# Patient Record
Sex: FEMALE | Race: WHITE | NOT HISPANIC OR LATINO | Employment: OTHER | URBAN - METROPOLITAN AREA
[De-identification: names, ages, dates, MRNs, and addresses within clinical notes are randomized per-mention and may not be internally consistent; named-entity substitution may affect disease eponyms.]

---

## 2018-01-03 ENCOUNTER — EST. PATIENT EMERGENCY (OUTPATIENT)
Dept: URBAN - METROPOLITAN AREA CLINIC 43 | Facility: CLINIC | Age: 78
End: 2018-01-03

## 2018-01-03 DIAGNOSIS — H00.023: ICD-10-CM

## 2018-01-03 DIAGNOSIS — H00.026: ICD-10-CM

## 2018-01-03 PROCEDURE — 92012 INTRM OPH EXAM EST PATIENT: CPT

## 2018-01-03 PROCEDURE — 1036F TOBACCO NON-USER: CPT

## 2018-01-03 PROCEDURE — G8427 DOCREV CUR MEDS BY ELIG CLIN: HCPCS

## 2018-01-03 ASSESSMENT — VISUAL ACUITY
OD_SC: 20/30+1
OS_SC: 20/40-2
OS_SC: J1
OD_SC: J1

## 2018-10-11 ENCOUNTER — ESTABLISHED COMPREHENSIVE EXAM (OUTPATIENT)
Dept: URBAN - METROPOLITAN AREA CLINIC 43 | Facility: CLINIC | Age: 78
End: 2018-10-11

## 2018-10-11 DIAGNOSIS — Z96.1: ICD-10-CM

## 2018-10-11 DIAGNOSIS — H02.886: ICD-10-CM

## 2018-10-11 DIAGNOSIS — H04.123: ICD-10-CM

## 2018-10-11 DIAGNOSIS — H26.493: ICD-10-CM

## 2018-10-11 DIAGNOSIS — H02.883: ICD-10-CM

## 2018-10-11 PROCEDURE — G8428 CUR MEDS NOT DOCUMENT: HCPCS

## 2018-10-11 PROCEDURE — G9903 PT SCRN TBCO ID AS NON USER: HCPCS

## 2018-10-11 PROCEDURE — G8756 NO BP MEASURE DOC: HCPCS

## 2018-10-11 PROCEDURE — 92015 DETERMINE REFRACTIVE STATE: CPT

## 2018-10-11 PROCEDURE — 92014 COMPRE OPH EXAM EST PT 1/>: CPT

## 2018-10-11 PROCEDURE — 1036F TOBACCO NON-USER: CPT

## 2018-10-11 ASSESSMENT — VISUAL ACUITY
OD_SC: 20/40+2
OS_SC: J10
OS_SC: 20/50-2
OD_CC: J1
OS_CC: J1
OD_SC: J8

## 2018-10-11 ASSESSMENT — TONOMETRY
OS_IOP_MMHG: 16
OD_IOP_MMHG: 17

## 2019-08-28 ENCOUNTER — EST. PATIENT EMERGENCY (OUTPATIENT)
Dept: URBAN - METROPOLITAN AREA CLINIC 43 | Facility: CLINIC | Age: 79
End: 2019-08-28

## 2019-08-28 DIAGNOSIS — H26.493: ICD-10-CM

## 2019-08-28 DIAGNOSIS — H02.883: ICD-10-CM

## 2019-08-28 DIAGNOSIS — H04.123: ICD-10-CM

## 2019-08-28 DIAGNOSIS — H35.341: ICD-10-CM

## 2019-08-28 DIAGNOSIS — H35.3211: ICD-10-CM

## 2019-08-28 DIAGNOSIS — H02.886: ICD-10-CM

## 2019-08-28 PROCEDURE — 92134 CPTRZ OPH DX IMG PST SGM RTA: CPT

## 2019-08-28 PROCEDURE — 92012 INTRM OPH EXAM EST PATIENT: CPT

## 2019-08-28 PROCEDURE — 92015 DETERMINE REFRACTIVE STATE: CPT

## 2019-08-28 ASSESSMENT — TONOMETRY
OD_IOP_MMHG: 18
OS_IOP_MMHG: 19

## 2019-08-28 ASSESSMENT — VISUAL ACUITY
OS_SC: 20/40-2
OD_SC: 20/400

## 2019-09-04 ENCOUNTER — RETINA CONSULT (OUTPATIENT)
Dept: URBAN - METROPOLITAN AREA CLINIC 43 | Facility: CLINIC | Age: 79
End: 2019-09-04

## 2019-09-04 DIAGNOSIS — H35.363: ICD-10-CM

## 2019-09-04 DIAGNOSIS — H35.731: ICD-10-CM

## 2019-09-04 DIAGNOSIS — H35.3211: ICD-10-CM

## 2019-09-04 DIAGNOSIS — H35.3122: ICD-10-CM

## 2019-09-04 DIAGNOSIS — H35.722: ICD-10-CM

## 2019-09-04 DIAGNOSIS — H43.813: ICD-10-CM

## 2019-09-04 PROCEDURE — 92250 FUNDUS PHOTOGRAPHY W/I&R: CPT

## 2019-09-04 PROCEDURE — 9222550 BILAT EXTENDED OPHTHALMOSCOPY, FIRST

## 2019-09-04 PROCEDURE — 92014 COMPRE OPH EXAM EST PT 1/>: CPT

## 2019-09-04 PROCEDURE — 67028 INJECTION EYE DRUG: CPT

## 2019-09-04 PROCEDURE — 92235 FLUORESCEIN ANGRPH MLTIFRAME: CPT

## 2019-09-04 ASSESSMENT — VISUAL ACUITY
OS_SC: 20/30-2
OD_SC: 20/200

## 2019-09-04 ASSESSMENT — TONOMETRY
OS_IOP_MMHG: 20
OD_IOP_MMHG: 20

## 2019-09-16 ENCOUNTER — EST. PATIENT EMERGENCY (OUTPATIENT)
Dept: URBAN - METROPOLITAN AREA CLINIC 43 | Facility: CLINIC | Age: 79
End: 2019-09-16

## 2019-09-16 DIAGNOSIS — H04.123: ICD-10-CM

## 2019-09-16 DIAGNOSIS — H02.883: ICD-10-CM

## 2019-09-16 DIAGNOSIS — H02.886: ICD-10-CM

## 2019-09-16 PROCEDURE — 92012 INTRM OPH EXAM EST PATIENT: CPT

## 2019-09-16 RX ORDER — LOTEPREDNOL ETABONATE 5 MG/G: 1 GEL OPHTHALMIC TWICE A DAY

## 2019-09-16 ASSESSMENT — VISUAL ACUITY
OS_SC: 20/40-2
OD_SC: CF 6FT

## 2019-10-04 ENCOUNTER — ESTABLISHED PATIENT (OUTPATIENT)
Dept: URBAN - METROPOLITAN AREA CLINIC 43 | Facility: CLINIC | Age: 79
End: 2019-10-04

## 2019-10-04 DIAGNOSIS — H35.363: ICD-10-CM

## 2019-10-04 DIAGNOSIS — H43.813: ICD-10-CM

## 2019-10-04 DIAGNOSIS — H35.3122: ICD-10-CM

## 2019-10-04 DIAGNOSIS — H35.3211: ICD-10-CM

## 2019-10-04 DIAGNOSIS — H35.731: ICD-10-CM

## 2019-10-04 DIAGNOSIS — H35.722: ICD-10-CM

## 2019-10-04 PROCEDURE — 67028 INJECTION EYE DRUG: CPT

## 2019-10-04 PROCEDURE — 92134 CPTRZ OPH DX IMG PST SGM RTA: CPT

## 2019-10-04 PROCEDURE — 92012 INTRM OPH EXAM EST PATIENT: CPT

## 2019-10-04 ASSESSMENT — TONOMETRY
OS_IOP_MMHG: 12
OD_IOP_MMHG: 13

## 2019-10-04 ASSESSMENT — VISUAL ACUITY
OD_SC: CF 4FT
OS_SC: 20/50-2

## 2019-11-01 ENCOUNTER — ESTABLISHED PATIENT (OUTPATIENT)
Dept: URBAN - METROPOLITAN AREA CLINIC 43 | Facility: CLINIC | Age: 79
End: 2019-11-01

## 2019-11-01 DIAGNOSIS — H35.3211: ICD-10-CM

## 2019-11-01 DIAGNOSIS — H35.3122: ICD-10-CM

## 2019-11-01 PROCEDURE — 92273 FULL FIELD ERG W/I&R: CPT

## 2019-11-01 PROCEDURE — 92250 FUNDUS PHOTOGRAPHY W/I&R: CPT

## 2019-11-01 PROCEDURE — 92012 INTRM OPH EXAM EST PATIENT: CPT

## 2019-11-01 PROCEDURE — 67028 INJECTION EYE DRUG: CPT

## 2019-11-01 PROCEDURE — 9222650 BILAT EXTENDED OPHTHALMOSCOPY, F/U

## 2019-11-01 PROCEDURE — 92134 CPTRZ OPH DX IMG PST SGM RTA: CPT

## 2019-11-01 ASSESSMENT — TONOMETRY
OD_IOP_MMHG: 14
OS_IOP_MMHG: 13

## 2019-11-01 ASSESSMENT — VISUAL ACUITY
OD_SC: 20/60-2
OS_SC: 20/40+1

## 2019-12-04 ENCOUNTER — ESTABLISHED COMPREHENSIVE EXAM (OUTPATIENT)
Dept: URBAN - METROPOLITAN AREA CLINIC 43 | Facility: CLINIC | Age: 79
End: 2019-12-04

## 2019-12-04 DIAGNOSIS — Z96.1: ICD-10-CM

## 2019-12-04 DIAGNOSIS — H35.363: ICD-10-CM

## 2019-12-04 DIAGNOSIS — H31.013: ICD-10-CM

## 2019-12-04 DIAGNOSIS — H04.123: ICD-10-CM

## 2019-12-04 DIAGNOSIS — H35.731: ICD-10-CM

## 2019-12-04 DIAGNOSIS — H35.722: ICD-10-CM

## 2019-12-04 DIAGNOSIS — H35.09: ICD-10-CM

## 2019-12-04 DIAGNOSIS — H35.3211: ICD-10-CM

## 2019-12-04 DIAGNOSIS — H35.033: ICD-10-CM

## 2019-12-04 DIAGNOSIS — H02.886: ICD-10-CM

## 2019-12-04 DIAGNOSIS — H35.3122: ICD-10-CM

## 2019-12-04 DIAGNOSIS — H02.883: ICD-10-CM

## 2019-12-04 DIAGNOSIS — H43.813: ICD-10-CM

## 2019-12-04 PROCEDURE — 92015 DETERMINE REFRACTIVE STATE: CPT

## 2019-12-04 PROCEDURE — 92014 COMPRE OPH EXAM EST PT 1/>: CPT

## 2019-12-04 ASSESSMENT — VISUAL ACUITY
OD_SC: 20/60-2
OS_CC: J1
OD_SC: J10
OD_CC: J2-
OS_SC: 20/40-2
OS_SC: J8-

## 2019-12-04 ASSESSMENT — TONOMETRY
OS_IOP_MMHG: 18
OD_IOP_MMHG: 18

## 2019-12-06 ENCOUNTER — ESTABLISHED PATIENT (OUTPATIENT)
Dept: URBAN - METROPOLITAN AREA CLINIC 43 | Facility: CLINIC | Age: 79
End: 2019-12-06

## 2019-12-06 DIAGNOSIS — H35.3122: ICD-10-CM

## 2019-12-06 DIAGNOSIS — H35.731: ICD-10-CM

## 2019-12-06 DIAGNOSIS — H43.813: ICD-10-CM

## 2019-12-06 DIAGNOSIS — H35.3211: ICD-10-CM

## 2019-12-06 DIAGNOSIS — H31.013: ICD-10-CM

## 2019-12-06 DIAGNOSIS — H35.722: ICD-10-CM

## 2019-12-06 DIAGNOSIS — H35.033: ICD-10-CM

## 2019-12-06 DIAGNOSIS — H35.363: ICD-10-CM

## 2019-12-06 DIAGNOSIS — H35.09: ICD-10-CM

## 2019-12-06 PROCEDURE — 92250 FUNDUS PHOTOGRAPHY W/I&R: CPT

## 2019-12-06 PROCEDURE — 92235 FLUORESCEIN ANGRPH MLTIFRAME: CPT

## 2019-12-06 PROCEDURE — 67028 INJECTION EYE DRUG: CPT

## 2019-12-06 PROCEDURE — 92012 INTRM OPH EXAM EST PATIENT: CPT

## 2019-12-06 ASSESSMENT — VISUAL ACUITY
OD_SC: 20/70-1
OS_SC: 20/40-2

## 2019-12-06 ASSESSMENT — TONOMETRY
OD_IOP_MMHG: 14
OS_IOP_MMHG: 16

## 2020-01-10 ENCOUNTER — ESTABLISHED PATIENT (OUTPATIENT)
Dept: URBAN - METROPOLITAN AREA CLINIC 43 | Facility: CLINIC | Age: 80
End: 2020-01-10

## 2020-01-10 DIAGNOSIS — H35.3211: ICD-10-CM

## 2020-01-10 DIAGNOSIS — H35.3122: ICD-10-CM

## 2020-01-10 PROCEDURE — 92134 CPTRZ OPH DX IMG PST SGM RTA: CPT

## 2020-01-10 PROCEDURE — 92012 INTRM OPH EXAM EST PATIENT: CPT

## 2020-01-10 PROCEDURE — 67028 INJECTION EYE DRUG: CPT

## 2020-01-10 ASSESSMENT — TONOMETRY
OD_IOP_MMHG: 12
OS_IOP_MMHG: 13

## 2020-01-10 ASSESSMENT — VISUAL ACUITY
OD_SC: 20/60+1
OS_SC: 20/40-1

## 2020-02-19 ENCOUNTER — ESTABLISHED PATIENT (OUTPATIENT)
Dept: URBAN - METROPOLITAN AREA CLINIC 43 | Facility: CLINIC | Age: 80
End: 2020-02-19

## 2020-02-19 DIAGNOSIS — H35.3211: ICD-10-CM

## 2020-02-19 DIAGNOSIS — H35.3122: ICD-10-CM

## 2020-02-19 DIAGNOSIS — H35.722: ICD-10-CM

## 2020-02-19 DIAGNOSIS — H35.731: ICD-10-CM

## 2020-02-19 PROCEDURE — 92235 FLUORESCEIN ANGRPH MLTIFRAME: CPT

## 2020-02-19 PROCEDURE — 92250 FUNDUS PHOTOGRAPHY W/I&R: CPT

## 2020-02-19 PROCEDURE — 67028 INJECTION EYE DRUG: CPT

## 2020-02-19 PROCEDURE — 92012 INTRM OPH EXAM EST PATIENT: CPT

## 2020-02-19 ASSESSMENT — VISUAL ACUITY
OD_SC: 20/70+2
OS_SC: 20/40

## 2020-02-19 ASSESSMENT — TONOMETRY
OS_IOP_MMHG: 18
OD_IOP_MMHG: 19

## 2020-03-25 ENCOUNTER — ESTABLISHED PATIENT (OUTPATIENT)
Dept: URBAN - METROPOLITAN AREA CLINIC 43 | Facility: CLINIC | Age: 80
End: 2020-03-25

## 2020-03-25 DIAGNOSIS — H35.363: ICD-10-CM

## 2020-03-25 DIAGNOSIS — H35.3122: ICD-10-CM

## 2020-03-25 DIAGNOSIS — H43.813: ICD-10-CM

## 2020-03-25 DIAGNOSIS — H35.033: ICD-10-CM

## 2020-03-25 DIAGNOSIS — H35.3211: ICD-10-CM

## 2020-03-25 DIAGNOSIS — H35.731: ICD-10-CM

## 2020-03-25 DIAGNOSIS — H35.09: ICD-10-CM

## 2020-03-25 DIAGNOSIS — H35.722: ICD-10-CM

## 2020-03-25 PROCEDURE — 92134 CPTRZ OPH DX IMG PST SGM RTA: CPT

## 2020-03-25 PROCEDURE — 92014 COMPRE OPH EXAM EST PT 1/>: CPT

## 2020-03-25 PROCEDURE — 92201 OPSCPY EXTND RTA DRAW UNI/BI: CPT

## 2020-03-25 PROCEDURE — 67028 INJECTION EYE DRUG: CPT

## 2020-03-25 ASSESSMENT — TONOMETRY
OS_IOP_MMHG: 20
OD_IOP_MMHG: 20

## 2020-03-25 ASSESSMENT — VISUAL ACUITY
OD_SC: 20/60-2
OS_SC: 20/40

## 2020-05-06 ENCOUNTER — ESTABLISHED PATIENT (OUTPATIENT)
Dept: URBAN - METROPOLITAN AREA CLINIC 43 | Facility: CLINIC | Age: 80
End: 2020-05-06

## 2020-05-06 DIAGNOSIS — H35.3122: ICD-10-CM

## 2020-05-06 DIAGNOSIS — H35.722: ICD-10-CM

## 2020-05-06 DIAGNOSIS — H35.731: ICD-10-CM

## 2020-05-06 DIAGNOSIS — H35.3211: ICD-10-CM

## 2020-05-06 PROCEDURE — 92012 INTRM OPH EXAM EST PATIENT: CPT

## 2020-05-06 PROCEDURE — 67028 INJECTION EYE DRUG: CPT

## 2020-05-06 PROCEDURE — 92134 CPTRZ OPH DX IMG PST SGM RTA: CPT

## 2020-05-06 PROCEDURE — 92235 FLUORESCEIN ANGRPH MLTIFRAME: CPT

## 2020-05-06 ASSESSMENT — VISUAL ACUITY
OS_SC: 20/30
OD_SC: 20/60

## 2020-06-24 ENCOUNTER — ESTABLISHED PATIENT (OUTPATIENT)
Dept: URBAN - METROPOLITAN AREA CLINIC 43 | Facility: CLINIC | Age: 80
End: 2020-06-24

## 2020-06-24 DIAGNOSIS — H35.3122: ICD-10-CM

## 2020-06-24 DIAGNOSIS — H35.09: ICD-10-CM

## 2020-06-24 DIAGNOSIS — H35.033: ICD-10-CM

## 2020-06-24 DIAGNOSIS — H35.731: ICD-10-CM

## 2020-06-24 DIAGNOSIS — H35.722: ICD-10-CM

## 2020-06-24 DIAGNOSIS — H43.813: ICD-10-CM

## 2020-06-24 DIAGNOSIS — H35.363: ICD-10-CM

## 2020-06-24 DIAGNOSIS — H35.3211: ICD-10-CM

## 2020-06-24 DIAGNOSIS — H31.013: ICD-10-CM

## 2020-06-24 PROCEDURE — 92134 CPTRZ OPH DX IMG PST SGM RTA: CPT

## 2020-06-24 PROCEDURE — 92201 OPSCPY EXTND RTA DRAW UNI/BI: CPT

## 2020-06-24 PROCEDURE — 92014 COMPRE OPH EXAM EST PT 1/>: CPT

## 2020-06-24 PROCEDURE — 67028 INJECTION EYE DRUG: CPT

## 2020-06-24 ASSESSMENT — VISUAL ACUITY
OS_SC: 20/50
OS_PH: 20/30-1
OD_PH: 20/60+1
OD_SC: 20/70+2

## 2020-08-12 ENCOUNTER — ESTABLISHED PATIENT (OUTPATIENT)
Dept: URBAN - METROPOLITAN AREA CLINIC 43 | Facility: CLINIC | Age: 80
End: 2020-08-12

## 2020-08-12 DIAGNOSIS — H35.3211: ICD-10-CM

## 2020-08-12 DIAGNOSIS — H35.731: ICD-10-CM

## 2020-08-12 DIAGNOSIS — H43.813: ICD-10-CM

## 2020-08-12 DIAGNOSIS — H35.30: ICD-10-CM

## 2020-08-12 DIAGNOSIS — H35.722: ICD-10-CM

## 2020-08-12 DIAGNOSIS — H35.363: ICD-10-CM

## 2020-08-12 DIAGNOSIS — H31.013: ICD-10-CM

## 2020-08-12 DIAGNOSIS — H35.3122: ICD-10-CM

## 2020-08-12 PROCEDURE — 67028 INJECTION EYE DRUG: CPT

## 2020-08-12 PROCEDURE — 92012 INTRM OPH EXAM EST PATIENT: CPT

## 2020-08-12 PROCEDURE — 92250 FUNDUS PHOTOGRAPHY W/I&R: CPT

## 2020-08-12 ASSESSMENT — VISUAL ACUITY
OS_CC: 20/25
OD_CC: 20/40

## 2020-08-12 ASSESSMENT — TONOMETRY
OS_IOP_MMHG: 16
OD_IOP_MMHG: 16

## 2020-09-30 ENCOUNTER — ESTABLISHED PATIENT (OUTPATIENT)
Dept: URBAN - METROPOLITAN AREA CLINIC 43 | Facility: CLINIC | Age: 80
End: 2020-09-30

## 2020-09-30 DIAGNOSIS — H43.813: ICD-10-CM

## 2020-09-30 DIAGNOSIS — H35.722: ICD-10-CM

## 2020-09-30 DIAGNOSIS — H35.3122: ICD-10-CM

## 2020-09-30 DIAGNOSIS — H35.3211: ICD-10-CM

## 2020-09-30 DIAGNOSIS — H35.731: ICD-10-CM

## 2020-09-30 DIAGNOSIS — H35.033: ICD-10-CM

## 2020-09-30 DIAGNOSIS — H35.363: ICD-10-CM

## 2020-09-30 DIAGNOSIS — H31.013: ICD-10-CM

## 2020-09-30 PROCEDURE — 92202 OPSCPY EXTND ON/MAC DRAW: CPT

## 2020-09-30 PROCEDURE — 67028 INJECTION EYE DRUG: CPT

## 2020-09-30 PROCEDURE — 92134 CPTRZ OPH DX IMG PST SGM RTA: CPT

## 2020-09-30 PROCEDURE — 92012 INTRM OPH EXAM EST PATIENT: CPT

## 2020-09-30 ASSESSMENT — VISUAL ACUITY
OD_CC: 20/40
OS_CC: 20/30-2

## 2020-09-30 ASSESSMENT — TONOMETRY
OS_IOP_MMHG: 12
OD_IOP_MMHG: 12

## 2020-11-18 ENCOUNTER — ESTABLISHED PATIENT (OUTPATIENT)
Dept: URBAN - METROPOLITAN AREA CLINIC 43 | Facility: CLINIC | Age: 80
End: 2020-11-18

## 2020-11-18 DIAGNOSIS — H35.09: ICD-10-CM

## 2020-11-18 DIAGNOSIS — H35.363: ICD-10-CM

## 2020-11-18 DIAGNOSIS — H35.3211: ICD-10-CM

## 2020-11-18 DIAGNOSIS — H35.731: ICD-10-CM

## 2020-11-18 DIAGNOSIS — H31.013: ICD-10-CM

## 2020-11-18 DIAGNOSIS — H35.722: ICD-10-CM

## 2020-11-18 DIAGNOSIS — H35.033: ICD-10-CM

## 2020-11-18 DIAGNOSIS — H43.813: ICD-10-CM

## 2020-11-18 DIAGNOSIS — H35.3122: ICD-10-CM

## 2020-11-18 PROCEDURE — 67028 INJECTION EYE DRUG: CPT

## 2020-11-18 PROCEDURE — 92012 INTRM OPH EXAM EST PATIENT: CPT

## 2020-11-18 PROCEDURE — 92250 FUNDUS PHOTOGRAPHY W/I&R: CPT

## 2020-11-18 ASSESSMENT — VISUAL ACUITY
OD_SC: 20/25-1
OS_SC: 20/25

## 2020-11-18 ASSESSMENT — TONOMETRY
OS_IOP_MMHG: 18
OD_IOP_MMHG: 17

## 2020-12-07 ENCOUNTER — ESTABLISHED COMPREHENSIVE EXAM (OUTPATIENT)
Dept: URBAN - METROPOLITAN AREA CLINIC 43 | Facility: CLINIC | Age: 80
End: 2020-12-07

## 2020-12-07 DIAGNOSIS — H35.722: ICD-10-CM

## 2020-12-07 DIAGNOSIS — H35.3211: ICD-10-CM

## 2020-12-07 DIAGNOSIS — H04.123: ICD-10-CM

## 2020-12-07 DIAGNOSIS — H35.363: ICD-10-CM

## 2020-12-07 DIAGNOSIS — H35.3122: ICD-10-CM

## 2020-12-07 DIAGNOSIS — H02.883: ICD-10-CM

## 2020-12-07 DIAGNOSIS — H31.013: ICD-10-CM

## 2020-12-07 DIAGNOSIS — H43.813: ICD-10-CM

## 2020-12-07 DIAGNOSIS — H35.731: ICD-10-CM

## 2020-12-07 DIAGNOSIS — H35.30: ICD-10-CM

## 2020-12-07 DIAGNOSIS — Z96.1: ICD-10-CM

## 2020-12-07 PROCEDURE — 92015 DETERMINE REFRACTIVE STATE: CPT

## 2020-12-07 PROCEDURE — 92014 COMPRE OPH EXAM EST PT 1/>: CPT

## 2020-12-07 ASSESSMENT — VISUAL ACUITY
OS_SC: 20/60-1+1
OS_SC: J6-
OD_CC: J2
OD_CC: 20/50+1
OS_CC: 20/25-1
OD_SC: 20/70
OS_CC: J1
OD_SC: J10

## 2020-12-07 ASSESSMENT — TONOMETRY
OS_IOP_MMHG: 19
OD_IOP_MMHG: 16

## 2021-01-13 ENCOUNTER — ESTABLISHED PATIENT (OUTPATIENT)
Dept: URBAN - METROPOLITAN AREA CLINIC 43 | Facility: CLINIC | Age: 81
End: 2021-01-13

## 2021-01-13 DIAGNOSIS — H31.013: ICD-10-CM

## 2021-01-13 DIAGNOSIS — H35.30: ICD-10-CM

## 2021-01-13 DIAGNOSIS — H35.033: ICD-10-CM

## 2021-01-13 DIAGNOSIS — H35.731: ICD-10-CM

## 2021-01-13 DIAGNOSIS — H35.3122: ICD-10-CM

## 2021-01-13 DIAGNOSIS — H35.722: ICD-10-CM

## 2021-01-13 DIAGNOSIS — H43.813: ICD-10-CM

## 2021-01-13 DIAGNOSIS — H35.09: ICD-10-CM

## 2021-01-13 DIAGNOSIS — H35.3211: ICD-10-CM

## 2021-01-13 DIAGNOSIS — H35.363: ICD-10-CM

## 2021-01-13 PROCEDURE — 67028 INJECTION EYE DRUG: CPT

## 2021-01-13 PROCEDURE — 99213 OFFICE O/P EST LOW 20 MIN: CPT

## 2021-01-13 PROCEDURE — 92250 FUNDUS PHOTOGRAPHY W/I&R: CPT

## 2021-01-13 ASSESSMENT — VISUAL ACUITY
OD_CC: 20/40
OS_CC: 20/40

## 2021-01-13 ASSESSMENT — TONOMETRY
OD_IOP_MMHG: 18
OS_IOP_MMHG: 17

## 2021-03-15 ENCOUNTER — ESTABLISHED PATIENT (OUTPATIENT)
Dept: URBAN - METROPOLITAN AREA CLINIC 43 | Facility: CLINIC | Age: 81
End: 2021-03-15

## 2021-03-15 DIAGNOSIS — H35.722: ICD-10-CM

## 2021-03-15 DIAGNOSIS — H35.3211: ICD-10-CM

## 2021-03-15 DIAGNOSIS — H40.013: ICD-10-CM

## 2021-03-15 DIAGNOSIS — H35.731: ICD-10-CM

## 2021-03-15 DIAGNOSIS — H35.033: ICD-10-CM

## 2021-03-15 DIAGNOSIS — H35.3122: ICD-10-CM

## 2021-03-15 DIAGNOSIS — H35.30: ICD-10-CM

## 2021-03-15 DIAGNOSIS — H35.363: ICD-10-CM

## 2021-03-15 DIAGNOSIS — H31.013: ICD-10-CM

## 2021-03-15 DIAGNOSIS — H43.813: ICD-10-CM

## 2021-03-15 PROCEDURE — 99213 OFFICE O/P EST LOW 20 MIN: CPT

## 2021-03-15 PROCEDURE — 67028 INJECTION EYE DRUG: CPT

## 2021-03-15 PROCEDURE — 92250 FUNDUS PHOTOGRAPHY W/I&R: CPT

## 2021-03-15 ASSESSMENT — TONOMETRY
OS_IOP_MMHG: 17
OD_IOP_MMHG: 16

## 2021-03-15 ASSESSMENT — VISUAL ACUITY
OD_CC: 20/25-2
OS_CC: 20/25+2

## 2021-05-12 ENCOUNTER — ESTABLISHED PATIENT (OUTPATIENT)
Dept: URBAN - METROPOLITAN AREA CLINIC 43 | Facility: CLINIC | Age: 81
End: 2021-05-12

## 2021-05-12 DIAGNOSIS — H35.731: ICD-10-CM

## 2021-05-12 DIAGNOSIS — H35.3122: ICD-10-CM

## 2021-05-12 DIAGNOSIS — H35.09: ICD-10-CM

## 2021-05-12 DIAGNOSIS — H40.013: ICD-10-CM

## 2021-05-12 DIAGNOSIS — H35.3211: ICD-10-CM

## 2021-05-12 DIAGNOSIS — H35.033: ICD-10-CM

## 2021-05-12 DIAGNOSIS — H35.722: ICD-10-CM

## 2021-05-12 DIAGNOSIS — H31.013: ICD-10-CM

## 2021-05-12 DIAGNOSIS — H35.363: ICD-10-CM

## 2021-05-12 DIAGNOSIS — H43.813: ICD-10-CM

## 2021-05-12 PROCEDURE — 92250 FUNDUS PHOTOGRAPHY W/I&R: CPT

## 2021-05-12 PROCEDURE — 99213 OFFICE O/P EST LOW 20 MIN: CPT

## 2021-05-12 PROCEDURE — 67028 INJECTION EYE DRUG: CPT

## 2021-05-12 ASSESSMENT — TONOMETRY
OD_IOP_MMHG: 15
OS_IOP_MMHG: 16

## 2021-05-12 ASSESSMENT — VISUAL ACUITY
OD_CC: 20/30+2
OS_CC: 20/25-2

## 2021-07-14 ENCOUNTER — ESTABLISHED PATIENT (OUTPATIENT)
Dept: URBAN - METROPOLITAN AREA CLINIC 43 | Facility: CLINIC | Age: 81
End: 2021-07-14

## 2021-07-14 DIAGNOSIS — H35.3122: ICD-10-CM

## 2021-07-14 DIAGNOSIS — H35.722: ICD-10-CM

## 2021-07-14 DIAGNOSIS — H35.731: ICD-10-CM

## 2021-07-14 DIAGNOSIS — H35.033: ICD-10-CM

## 2021-07-14 DIAGNOSIS — H35.363: ICD-10-CM

## 2021-07-14 DIAGNOSIS — H43.813: ICD-10-CM

## 2021-07-14 DIAGNOSIS — H35.3211: ICD-10-CM

## 2021-07-14 PROCEDURE — 92250 FUNDUS PHOTOGRAPHY W/I&R: CPT

## 2021-07-14 PROCEDURE — 99213 OFFICE O/P EST LOW 20 MIN: CPT

## 2021-07-14 PROCEDURE — 67028 INJECTION EYE DRUG: CPT

## 2021-07-14 ASSESSMENT — TONOMETRY
OD_IOP_MMHG: 19
OS_IOP_MMHG: 21

## 2021-07-14 ASSESSMENT — VISUAL ACUITY
OD_CC: 20/40+1
OS_CC: 20/25-1

## 2021-09-01 ENCOUNTER — ESTABLISHED PATIENT (OUTPATIENT)
Dept: URBAN - METROPOLITAN AREA CLINIC 43 | Facility: CLINIC | Age: 81
End: 2021-09-01

## 2021-09-01 DIAGNOSIS — H35.3211: ICD-10-CM

## 2021-09-01 DIAGNOSIS — H35.3122: ICD-10-CM

## 2021-09-01 DIAGNOSIS — H35.722: ICD-10-CM

## 2021-09-01 DIAGNOSIS — H35.731: ICD-10-CM

## 2021-09-01 DIAGNOSIS — H35.363: ICD-10-CM

## 2021-09-01 DIAGNOSIS — H35.033: ICD-10-CM

## 2021-09-01 DIAGNOSIS — H43.813: ICD-10-CM

## 2021-09-01 DIAGNOSIS — H31.013: ICD-10-CM

## 2021-09-01 PROCEDURE — 67028 INJECTION EYE DRUG: CPT

## 2021-09-01 PROCEDURE — 92202 OPSCPY EXTND ON/MAC DRAW: CPT

## 2021-09-01 PROCEDURE — 92134 CPTRZ OPH DX IMG PST SGM RTA: CPT

## 2021-09-01 PROCEDURE — 99213 OFFICE O/P EST LOW 20 MIN: CPT

## 2021-09-01 ASSESSMENT — VISUAL ACUITY
OD_CC: 20/20-1
OS_CC: 20/25+2

## 2021-09-01 ASSESSMENT — TONOMETRY
OD_IOP_MMHG: 17
OS_IOP_MMHG: 18

## 2021-10-27 ENCOUNTER — ESTABLISHED PATIENT (OUTPATIENT)
Dept: URBAN - METROPOLITAN AREA CLINIC 43 | Facility: CLINIC | Age: 81
End: 2021-10-27

## 2021-10-27 DIAGNOSIS — H35.3211: ICD-10-CM

## 2021-10-27 DIAGNOSIS — H35.033: ICD-10-CM

## 2021-10-27 DIAGNOSIS — H35.731: ICD-10-CM

## 2021-10-27 DIAGNOSIS — H35.722: ICD-10-CM

## 2021-10-27 DIAGNOSIS — H35.3122: ICD-10-CM

## 2021-10-27 DIAGNOSIS — H43.813: ICD-10-CM

## 2021-10-27 DIAGNOSIS — H31.013: ICD-10-CM

## 2021-10-27 DIAGNOSIS — H35.363: ICD-10-CM

## 2021-10-27 PROCEDURE — 92134 CPTRZ OPH DX IMG PST SGM RTA: CPT

## 2021-10-27 PROCEDURE — 67028 INJECTION EYE DRUG: CPT

## 2021-10-27 PROCEDURE — 99213 OFFICE O/P EST LOW 20 MIN: CPT

## 2021-10-27 ASSESSMENT — TONOMETRY
OS_IOP_MMHG: 15
OD_IOP_MMHG: 15

## 2021-10-27 ASSESSMENT — VISUAL ACUITY
OS_CC: 20/30-2
OD_CC: 20/30-1

## 2021-12-07 ENCOUNTER — COMPREHENSIVE EXAM (OUTPATIENT)
Dept: URBAN - METROPOLITAN AREA CLINIC 43 | Facility: CLINIC | Age: 81
End: 2021-12-07

## 2021-12-07 DIAGNOSIS — H04.123: ICD-10-CM

## 2021-12-07 DIAGNOSIS — H35.722: ICD-10-CM

## 2021-12-07 DIAGNOSIS — H35.033: ICD-10-CM

## 2021-12-07 DIAGNOSIS — H35.3122: ICD-10-CM

## 2021-12-07 DIAGNOSIS — Z96.1: ICD-10-CM

## 2021-12-07 DIAGNOSIS — H35.3211: ICD-10-CM

## 2021-12-07 DIAGNOSIS — H35.363: ICD-10-CM

## 2021-12-07 DIAGNOSIS — H43.813: ICD-10-CM

## 2021-12-07 DIAGNOSIS — H35.731: ICD-10-CM

## 2021-12-07 PROCEDURE — 92014 COMPRE OPH EXAM EST PT 1/>: CPT

## 2021-12-07 PROCEDURE — 92015 DETERMINE REFRACTIVE STATE: CPT

## 2021-12-07 ASSESSMENT — VISUAL ACUITY
OS_SC: 20/40-2
OS_SC: J3-
OD_SC: J10
OD_CC: J1
OD_SC: 20/70-2
OS_CC: J1
OS_CC: 20/25-2
OD_CC: 20/25-2

## 2021-12-07 ASSESSMENT — TONOMETRY
OD_IOP_MMHG: 17
OS_IOP_MMHG: 15

## 2021-12-22 ENCOUNTER — ESTABLISHED PATIENT (OUTPATIENT)
Dept: URBAN - METROPOLITAN AREA CLINIC 43 | Facility: CLINIC | Age: 81
End: 2021-12-22

## 2021-12-22 DIAGNOSIS — H35.3211: ICD-10-CM

## 2021-12-22 DIAGNOSIS — H35.3122: ICD-10-CM

## 2021-12-22 DIAGNOSIS — H35.033: ICD-10-CM

## 2021-12-22 DIAGNOSIS — H31.013: ICD-10-CM

## 2021-12-22 DIAGNOSIS — H43.813: ICD-10-CM

## 2021-12-22 DIAGNOSIS — H35.363: ICD-10-CM

## 2021-12-22 DIAGNOSIS — H35.722: ICD-10-CM

## 2021-12-22 DIAGNOSIS — H35.731: ICD-10-CM

## 2021-12-22 PROCEDURE — 99213 OFFICE O/P EST LOW 20 MIN: CPT

## 2021-12-22 PROCEDURE — 67028 INJECTION EYE DRUG: CPT

## 2021-12-22 PROCEDURE — 92134 CPTRZ OPH DX IMG PST SGM RTA: CPT

## 2021-12-22 ASSESSMENT — TONOMETRY
OD_IOP_MMHG: 20
OS_IOP_MMHG: 17

## 2021-12-22 ASSESSMENT — VISUAL ACUITY: OS_CC: 20/20

## 2022-02-16 ENCOUNTER — ESTABLISHED PATIENT (OUTPATIENT)
Dept: URBAN - METROPOLITAN AREA CLINIC 43 | Facility: CLINIC | Age: 82
End: 2022-02-16

## 2022-02-16 DIAGNOSIS — H43.813: ICD-10-CM

## 2022-02-16 DIAGNOSIS — H35.363: ICD-10-CM

## 2022-02-16 DIAGNOSIS — H35.033: ICD-10-CM

## 2022-02-16 DIAGNOSIS — H35.731: ICD-10-CM

## 2022-02-16 DIAGNOSIS — H35.722: ICD-10-CM

## 2022-02-16 DIAGNOSIS — H31.013: ICD-10-CM

## 2022-02-16 DIAGNOSIS — H35.3211: ICD-10-CM

## 2022-02-16 DIAGNOSIS — H35.3122: ICD-10-CM

## 2022-02-16 DIAGNOSIS — H10.13: ICD-10-CM

## 2022-02-16 PROCEDURE — 99214 OFFICE O/P EST MOD 30 MIN: CPT

## 2022-02-16 PROCEDURE — 92250 FUNDUS PHOTOGRAPHY W/I&R: CPT

## 2022-02-16 PROCEDURE — 67028 INJECTION EYE DRUG: CPT

## 2022-02-16 RX ORDER — BUTALBITAL, ASPIRIN, CAFFEINE AND CODEINE PHOSPHATE 30; 50; 40; 325 MG/1; MG/1; MG/1; MG/1: 1 CAPSULE ORAL TWICE A DAY

## 2022-02-16 ASSESSMENT — TONOMETRY
OS_IOP_MMHG: 19
OD_IOP_MMHG: 20

## 2022-02-16 ASSESSMENT — VISUAL ACUITY
OD_CC: 20/30-2
OS_CC: 20/20-2

## 2022-04-15 ENCOUNTER — CLINIC PROCEDURE ONLY (OUTPATIENT)
Dept: URBAN - METROPOLITAN AREA CLINIC 43 | Facility: CLINIC | Age: 82
End: 2022-04-15

## 2022-04-15 DIAGNOSIS — H35.3211: ICD-10-CM

## 2022-04-15 DIAGNOSIS — H35.731: ICD-10-CM

## 2022-04-15 PROCEDURE — 67028 INJECTION EYE DRUG: CPT

## 2022-04-15 PROCEDURE — 92250 FUNDUS PHOTOGRAPHY W/I&R: CPT

## 2022-04-15 ASSESSMENT — TONOMETRY
OD_IOP_MMHG: 19
OS_IOP_MMHG: 20

## 2022-04-15 ASSESSMENT — VISUAL ACUITY
OD_CC: 20/25-2
OS_CC: 20/20-1

## 2022-06-10 ENCOUNTER — ESTABLISHED PATIENT (OUTPATIENT)
Dept: URBAN - METROPOLITAN AREA CLINIC 43 | Facility: CLINIC | Age: 82
End: 2022-06-10

## 2022-06-10 DIAGNOSIS — H04.123: ICD-10-CM

## 2022-06-10 DIAGNOSIS — H35.3122: ICD-10-CM

## 2022-06-10 DIAGNOSIS — H10.13: ICD-10-CM

## 2022-06-10 DIAGNOSIS — H35.3211: ICD-10-CM

## 2022-06-10 DIAGNOSIS — H35.30: ICD-10-CM

## 2022-06-10 DIAGNOSIS — H35.722: ICD-10-CM

## 2022-06-10 DIAGNOSIS — H31.013: ICD-10-CM

## 2022-06-10 DIAGNOSIS — H35.033: ICD-10-CM

## 2022-06-10 DIAGNOSIS — H35.363: ICD-10-CM

## 2022-06-10 DIAGNOSIS — H35.09: ICD-10-CM

## 2022-06-10 DIAGNOSIS — H43.813: ICD-10-CM

## 2022-06-10 DIAGNOSIS — H40.013: ICD-10-CM

## 2022-06-10 DIAGNOSIS — H35.731: ICD-10-CM

## 2022-06-10 PROCEDURE — 99214 OFFICE O/P EST MOD 30 MIN: CPT

## 2022-06-10 PROCEDURE — 67028 INJECTION EYE DRUG: CPT

## 2022-06-10 PROCEDURE — 92250 FUNDUS PHOTOGRAPHY W/I&R: CPT

## 2022-06-10 ASSESSMENT — VISUAL ACUITY
OD_CC: 20/25+2
OS_CC: 20/25+2

## 2022-06-10 ASSESSMENT — TONOMETRY
OD_IOP_MMHG: 17
OS_IOP_MMHG: 17

## 2022-08-05 ENCOUNTER — CLINIC PROCEDURE ONLY (OUTPATIENT)
Dept: URBAN - METROPOLITAN AREA CLINIC 43 | Facility: CLINIC | Age: 82
End: 2022-08-05

## 2022-08-05 DIAGNOSIS — H35.731: ICD-10-CM

## 2022-08-05 DIAGNOSIS — H35.3211: ICD-10-CM

## 2022-08-05 PROCEDURE — 67028 INJECTION EYE DRUG: CPT

## 2022-08-05 PROCEDURE — 92250 FUNDUS PHOTOGRAPHY W/I&R: CPT

## 2022-08-05 ASSESSMENT — TONOMETRY: OD_IOP_MMHG: 19

## 2022-08-05 ASSESSMENT — VISUAL ACUITY
OS_CC: 20/30
OD_CC: 20/30-1

## 2022-12-07 ENCOUNTER — COMPREHENSIVE EXAM (OUTPATIENT)
Dept: URBAN - METROPOLITAN AREA CLINIC 43 | Facility: CLINIC | Age: 82
End: 2022-12-07

## 2022-12-07 PROCEDURE — 92015 DETERMINE REFRACTIVE STATE: CPT

## 2022-12-07 PROCEDURE — 92014 COMPRE OPH EXAM EST PT 1/>: CPT

## 2022-12-07 ASSESSMENT — VISUAL ACUITY
OD_SC: J8-
OS_SC: 20/50-1
OD_SC: 20/50-2
OD_CC: 20/30+2
OD_CC: J1
OS_CC: J1-
OS_CC: 20/25-2
OS_SC: J6-

## 2022-12-07 ASSESSMENT — TONOMETRY
OD_IOP_MMHG: 17
OS_IOP_MMHG: 18

## 2023-12-12 ENCOUNTER — APPOINTMENT (RX ONLY)
Dept: URBAN - METROPOLITAN AREA CLINIC 137 | Facility: CLINIC | Age: 83
Setting detail: DERMATOLOGY
End: 2023-12-12

## 2023-12-12 DIAGNOSIS — Z41.9 ENCOUNTER FOR PROCEDURE FOR PURPOSES OTHER THAN REMEDYING HEALTH STATE, UNSPECIFIED: ICD-10-CM

## 2023-12-12 PROCEDURE — ? INVENTORY

## 2023-12-12 PROCEDURE — ? FILLERS

## 2023-12-12 ASSESSMENT — LOCATION SIMPLE DESCRIPTION DERM
LOCATION SIMPLE: LEFT LIP
LOCATION SIMPLE: UPPER LIP
LOCATION SIMPLE: RIGHT CHEEK
LOCATION SIMPLE: RIGHT LIP
LOCATION SIMPLE: LEFT CHEEK
LOCATION SIMPLE: RIGHT UPPER LIP

## 2023-12-12 ASSESSMENT — LOCATION DETAILED DESCRIPTION DERM
LOCATION DETAILED: RIGHT SUPERIOR MEDIAL BUCCAL CHEEK
LOCATION DETAILED: LEFT SUPERIOR CENTRAL BUCCAL CHEEK
LOCATION DETAILED: RIGHT SUPERIOR VERMILION LIP
LOCATION DETAILED: RIGHT SUPERIOR VERMILION BORDER
LOCATION DETAILED: PHILTRUM
LOCATION DETAILED: LEFT MEDIAL BUCCAL CHEEK
LOCATION DETAILED: RIGHT MEDIAL BUCCAL CHEEK
LOCATION DETAILED: LEFT SUPERIOR VERMILION LIP
LOCATION DETAILED: LEFT CENTRAL BUCCAL CHEEK

## 2023-12-12 ASSESSMENT — LOCATION ZONE DERM
LOCATION ZONE: LIP
LOCATION ZONE: FACE

## 2023-12-12 NOTE — PROCEDURE: FILLERS
Decollete Filler Volume In Cc: 0
Filler Comments: Recheck fillers in 2 weeks, disc may need 2nd syringe to help augment NLF’s and marionette lines
Nasolabial Folds Filler Volume In Cc: 0.2
Aspiration Statement: Aspiration was performed prior to injecting site with filler.
Vermilion Lips Filler Volume In Cc: 0.8
Include Cannula Information In Note?: No
Consent: Written consent obtained. Risks include but not limited to bruising, beading, irregular texture, ulceration, infection, allergic reaction, scar formation, incomplete augmentation, temporary nature, and procedural pain.
Post-Care Instructions: After the procedure, patient instructed to apply ice to reduce swelling.
Topical Anesthesia?: BLT (benzocaine 23%, lidocaine 10%, tetracaine 10%)
Detail Level: Zone
Additional Area 1 Location: chin, submental fold
Map Statment: See Attach Map for Complete Details
Filler: Restylane Silk
Include Documentation That Aspiration Was Performed Prior To Injecting Filler:: Yes

## 2023-12-18 ENCOUNTER — COMPREHENSIVE EXAM (OUTPATIENT)
Dept: URBAN - METROPOLITAN AREA CLINIC 43 | Facility: CLINIC | Age: 83
End: 2023-12-18

## 2023-12-28 ENCOUNTER — APPOINTMENT (RX ONLY)
Dept: URBAN - METROPOLITAN AREA CLINIC 137 | Facility: CLINIC | Age: 83
Setting detail: DERMATOLOGY
End: 2023-12-28

## 2023-12-28 DIAGNOSIS — Z41.9 ENCOUNTER FOR PROCEDURE FOR PURPOSES OTHER THAN REMEDYING HEALTH STATE, UNSPECIFIED: ICD-10-CM

## 2023-12-28 PROCEDURE — ? FOLLOW UP FOR NEXT VISIT

## 2023-12-28 NOTE — HPI: FOLLOW UP FILLERS
previous_has_had_prior_Filler
When Was Your Last Filler Injection?: 12/12/23
Additional History: Follow up on fillers

## 2023-12-28 NOTE — PROCEDURE: FOLLOW UP FOR NEXT VISIT
Instructions (Optional): Patient interested in getting marionette lines treated however she has a dental appointment in 10 days. Instructed to wait until first week of February. Suggested using Restylane Lift at next visit for nasal laboal folds and marionette lines
Detail Level: Simple

## 2024-01-15 ENCOUNTER — COMPREHENSIVE EXAM (OUTPATIENT)
Dept: URBAN - METROPOLITAN AREA CLINIC 43 | Facility: CLINIC | Age: 84
End: 2024-01-15

## 2024-01-15 DIAGNOSIS — H26.493: ICD-10-CM

## 2024-01-15 DIAGNOSIS — H35.3122: ICD-10-CM

## 2024-01-15 DIAGNOSIS — H40.013: ICD-10-CM

## 2024-01-15 DIAGNOSIS — H35.731: ICD-10-CM

## 2024-01-15 DIAGNOSIS — H35.3211: ICD-10-CM

## 2024-01-15 DIAGNOSIS — H35.30: ICD-10-CM

## 2024-01-15 DIAGNOSIS — Z96.1: ICD-10-CM

## 2024-01-15 DIAGNOSIS — H04.123: ICD-10-CM

## 2024-01-15 DIAGNOSIS — H02.883: ICD-10-CM

## 2024-01-15 DIAGNOSIS — H02.886: ICD-10-CM

## 2024-01-15 DIAGNOSIS — H43.813: ICD-10-CM

## 2024-01-15 PROCEDURE — 92015 DETERMINE REFRACTIVE STATE: CPT

## 2024-01-15 PROCEDURE — 92014 COMPRE OPH EXAM EST PT 1/>: CPT

## 2024-01-15 ASSESSMENT — TONOMETRY
OS_IOP_MMHG: 14
OD_IOP_MMHG: 13

## 2024-01-15 ASSESSMENT — VISUAL ACUITY
OD_CC: 20/20
OD_CC: J2
OS_SC: 20/25
OS_CC: 20/20
OD_SC: 20/60-1
OS_SC: J12
OD_SC: J10
OS_CC: J1

## 2024-02-08 ENCOUNTER — APPOINTMENT (RX ONLY)
Dept: URBAN - METROPOLITAN AREA CLINIC 137 | Facility: CLINIC | Age: 84
Setting detail: DERMATOLOGY
End: 2024-02-08

## 2024-02-08 DIAGNOSIS — Z41.9 ENCOUNTER FOR PROCEDURE FOR PURPOSES OTHER THAN REMEDYING HEALTH STATE, UNSPECIFIED: ICD-10-CM

## 2024-02-08 PROCEDURE — ? INVENTORY

## 2024-02-08 PROCEDURE — ? FILLERS

## 2024-02-08 ASSESSMENT — LOCATION SIMPLE DESCRIPTION DERM
LOCATION SIMPLE: RIGHT CHEEK
LOCATION SIMPLE: LEFT CHEEK
LOCATION SIMPLE: LEFT LIP

## 2024-02-08 ASSESSMENT — LOCATION DETAILED DESCRIPTION DERM
LOCATION DETAILED: RIGHT CENTRAL BUCCAL CHEEK
LOCATION DETAILED: LEFT SUPERIOR MEDIAL BUCCAL CHEEK
LOCATION DETAILED: RIGHT MEDIAL BUCCAL CHEEK
LOCATION DETAILED: LEFT SUPERIOR CENTRAL BUCCAL CHEEK
LOCATION DETAILED: RIGHT INFERIOR MEDIAL MALAR CHEEK
LOCATION DETAILED: LEFT INFERIOR MEDIAL MALAR CHEEK
LOCATION DETAILED: LEFT CENTRAL BUCCAL CHEEK
LOCATION DETAILED: LEFT UPPER CUTANEOUS LIP

## 2024-02-08 ASSESSMENT — LOCATION ZONE DERM
LOCATION ZONE: LIP
LOCATION ZONE: FACE

## 2024-02-08 NOTE — PROCEDURE: FILLERS
Dorsal Hands Filler Volume In Cc: 0
Include Cannula Information In Note?: No
Additional Area 1 Location: chin, submental fold
Consent: Written consent obtained. Risks include but not limited to bruising, beading, irregular texture, ulceration, infection, allergic reaction, scar formation, incomplete augmentation, temporary nature, and procedural pain.
Post-Care Instructions: After the procedure, patient instructed to apply ice to reduce swelling.
Topical Anesthesia?: BLT (benzocaine 23%, lidocaine 10%, tetracaine 10%)
Detail Level: Zone
Filler: Sandra Webber
Map Statment: See Attach Map for Complete Details
Include Documentation That Aspiration Was Performed Prior To Injecting Filler:: Yes
Nasolabial Folds Filler Volume In Cc: 0.8
Aspiration Statement: Aspiration was performed prior to injecting site with filler.
Marionette Lines Filler Volume In Cc: 0.2

## 2024-02-26 ENCOUNTER — APPOINTMENT (RX ONLY)
Dept: URBAN - METROPOLITAN AREA CLINIC 137 | Facility: CLINIC | Age: 84
Setting detail: DERMATOLOGY
End: 2024-02-26

## 2024-02-26 DIAGNOSIS — Z41.9 ENCOUNTER FOR PROCEDURE FOR PURPOSES OTHER THAN REMEDYING HEALTH STATE, UNSPECIFIED: ICD-10-CM

## 2024-02-26 PROCEDURE — ? COSMETIC FOLLOW-UP

## 2024-02-26 NOTE — PROCEDURE: COSMETIC FOLLOW-UP
Detail Level: Zone
Comments (Free Text): Patient pleased with results at this visit. No other treatment is needed at this time.

## 2025-06-23 ENCOUNTER — APPOINTMENT (OUTPATIENT)
Dept: URBAN - METROPOLITAN AREA CLINIC 137 | Facility: CLINIC | Age: 85
Setting detail: DERMATOLOGY
End: 2025-06-23

## 2025-06-23 DIAGNOSIS — Z41.9 ENCOUNTER FOR PROCEDURE FOR PURPOSES OTHER THAN REMEDYING HEALTH STATE, UNSPECIFIED: ICD-10-CM

## 2025-06-23 PROCEDURE — ? FILLERS

## 2025-06-23 ASSESSMENT — LOCATION DETAILED DESCRIPTION DERM
LOCATION DETAILED: RIGHT NASOLABIAL FOLD
LOCATION DETAILED: LEFT MEDIAL BUCCAL CHEEK
LOCATION DETAILED: RIGHT INFERIOR VERMILION LIP
LOCATION DETAILED: LEFT SUPERIOR VERMILION LIP
LOCATION DETAILED: LEFT INFERIOR VERMILION LIP
LOCATION DETAILED: LEFT CENTRAL BUCCAL CHEEK
LOCATION DETAILED: RIGHT MEDIAL BUCCAL CHEEK
LOCATION DETAILED: LEFT SUPERIOR MEDIAL BUCCAL CHEEK
LOCATION DETAILED: RIGHT UPPER CUTANEOUS LIP
LOCATION DETAILED: RIGHT SUPERIOR VERMILION LIP
LOCATION DETAILED: RIGHT CENTRAL BUCCAL CHEEK
LOCATION DETAILED: LEFT UPPER CUTANEOUS LIP

## 2025-06-23 ASSESSMENT — LOCATION SIMPLE DESCRIPTION DERM
LOCATION SIMPLE: LEFT CHEEK
LOCATION SIMPLE: RIGHT CHEEK
LOCATION SIMPLE: RIGHT LIP
LOCATION SIMPLE: LEFT LIP

## 2025-06-23 ASSESSMENT — LOCATION ZONE DERM
LOCATION ZONE: FACE
LOCATION ZONE: LIP

## 2025-06-23 NOTE — PROCEDURE: FILLERS
Tear Troughs Filler Volume In Cc: 0
Vermilion Lips Filler Volume In Cc: 1
Include Cannula Information In Note?: No
Filler: Sandra Webber
Map Statment: See Attach Map for Complete Details
Consent: Written consent obtained. Risks include but not limited to bruising, beading, irregular texture, ulceration, infection, allergic reaction, scar formation, incomplete augmentation, temporary nature, procedural pain.
Post-Care Instructions: Patient instructed to apply ice to reduce swelling.
Nasolabial Folds Filler Volume In Cc: 0.6
Additional Anesthesia Volume In Cc: 6
Topical Anesthesia?: 20% benzocaine, 10% lidocaine, 4% tetracaine
Anesthesia Volume In Cc: 0.5
Marionette Lines Filler Volume In Cc: 0.4
Filler: Juvederm Ultra
Inventory Information: This plan will send filler information to inventory based on the fillers you select. Multiple fillers can be sent but you must ensure you select the appropriate fillers in the inventory tab.
Detail Level: Detailed
Show Inventory Tab: Show

## 2025-06-25 ENCOUNTER — APPOINTMENT (OUTPATIENT)
Dept: URBAN - METROPOLITAN AREA CLINIC 137 | Facility: CLINIC | Age: 85
Setting detail: DERMATOLOGY
End: 2025-06-25

## 2025-06-25 DIAGNOSIS — Z41.9 ENCOUNTER FOR PROCEDURE FOR PURPOSES OTHER THAN REMEDYING HEALTH STATE, UNSPECIFIED: ICD-10-CM

## 2025-06-25 PROCEDURE — ? COSMETIC FOLLOW-UP

## 2025-06-25 PROCEDURE — ? ADDITIONAL NOTES

## 2025-06-25 NOTE — HPI: COSMETIC FOLLOW UP
How Did You Tolerate The Procedure?: well, without problems
What Condition Are We Treating?: Wrinkles
What Procedure Did We Perform At The Last Visit?: Filler 4-23-25

## 2025-06-25 NOTE — PROCEDURE: ADDITIONAL NOTES
Additional Notes: Pt informed the bruising is causing her lines to be more defined looking.\\nPt informed to let the swelling go down\\nConsider  vertical lip lines at future visit if necessary
Render Risk Assessment In Note?: no
Detail Level: Simple